# Patient Record
Sex: FEMALE | Race: AMERICAN INDIAN OR ALASKA NATIVE
[De-identification: names, ages, dates, MRNs, and addresses within clinical notes are randomized per-mention and may not be internally consistent; named-entity substitution may affect disease eponyms.]

---

## 2019-02-08 ENCOUNTER — HOSPITAL ENCOUNTER (INPATIENT)
Dept: HOSPITAL 31 - C.ER | Age: 29
LOS: 9 days | Discharge: HOME | DRG: 750 | End: 2019-02-17
Attending: PSYCHIATRY & NEUROLOGY | Admitting: PSYCHIATRY & NEUROLOGY
Payer: MEDICAID

## 2019-02-08 VITALS — OXYGEN SATURATION: 100 %

## 2019-02-08 DIAGNOSIS — F20.9: Primary | ICD-10-CM

## 2019-02-08 DIAGNOSIS — F12.10: ICD-10-CM

## 2019-02-08 DIAGNOSIS — Z91.19: ICD-10-CM

## 2019-02-08 LAB
ALBUMIN SERPL-MCNC: 4.9 G/DL (ref 3.5–5)
ALBUMIN/GLOB SERPL: 1.3 {RATIO} (ref 1–2.1)
ALT SERPL-CCNC: 24 U/L (ref 9–52)
AST SERPL-CCNC: 22 U/L (ref 14–36)
BACTERIA #/AREA URNS HPF: (no result) /[HPF]
BASOPHILS # BLD AUTO: 0 K/UL (ref 0–0.2)
BASOPHILS NFR BLD: 0.5 % (ref 0–2)
BILIRUB UR-MCNC: (no result) MG/DL
BUN SERPL-MCNC: 12 MG/DL (ref 7–17)
CALCIUM SERPL-MCNC: 9.6 MG/DL (ref 8.6–10.4)
EOSINOPHIL # BLD AUTO: 0 K/UL (ref 0–0.7)
EOSINOPHIL NFR BLD: 0.1 % (ref 0–4)
ERYTHROCYTE [DISTWIDTH] IN BLOOD BY AUTOMATED COUNT: 13.7 % (ref 11.5–14.5)
GFR NON-AFRICAN AMERICAN: > 60
GLUCOSE UR STRIP-MCNC: NORMAL MG/DL
HCG,QUALITATIVE URINE: NEGATIVE
HGB BLD-MCNC: 16 G/DL (ref 11–16)
LEUKOCYTE ESTERASE UR-ACNC: (no result) LEU/UL
LYMPHOCYTES # BLD AUTO: 1.4 K/UL (ref 1–4.3)
LYMPHOCYTES NFR BLD AUTO: 16.3 % (ref 20–40)
MCH RBC QN AUTO: 29.5 PG (ref 27–31)
MCHC RBC AUTO-ENTMCNC: 33.3 G/DL (ref 33–37)
MCV RBC AUTO: 88.5 FL (ref 81–99)
MONOCYTES # BLD: 0.7 K/UL (ref 0–0.8)
MONOCYTES NFR BLD: 8.8 % (ref 0–10)
NEUTROPHILS # BLD: 6.2 K/UL (ref 1.8–7)
NEUTROPHILS NFR BLD AUTO: 74.3 % (ref 50–75)
NRBC BLD AUTO-RTO: 0 % (ref 0–2)
PH UR STRIP: 5 [PH] (ref 5–8)
PLATELET # BLD: 315 K/UL (ref 130–400)
PMV BLD AUTO: 9.6 FL (ref 7.2–11.7)
PROT UR STRIP-MCNC: (no result) MG/DL
RBC # BLD AUTO: 5.43 MIL/UL (ref 3.8–5.2)
RBC # UR STRIP: NEGATIVE /UL
SP GR UR STRIP: 1.03 (ref 1–1.03)
SQUAMOUS EPITHIAL: 5 /HPF (ref 0–5)
UROBILINOGEN UR-MCNC: 4 MG/DL (ref 0.2–1)
WBC # BLD AUTO: 8.4 K/UL (ref 4.8–10.8)

## 2019-02-08 NOTE — CT
Date of service: 



02/08/2019



PROCEDURE:  CT Chest with contrast (Pulmonary Angiogram)



HISTORY:

Tachycardia, tachypnea



COMPARISON:

None available.



TECHNIQUE:

Axial computed tomography images were obtained of the chest in the 

pulmonary arterial phase of enhancement. Coronal and sagittal 

reformatted images were created and reviewed.



Intravenous contrast dose: 100 cc Visipaque 320 contrast material.



Radiation dose:



Total exam DLP = 529.18 mGy-cm.



This CT exam was performed using one or more of the following dose 

reduction techniques: Automated exposure control, adjustment of the 

mA and/or kV according to patient size, and/or use of iterative 

reconstruction technique.



FINDINGS:



PULMONARY ARTERIES:

Unremarkable.  Visualized pulmonary trunk, right and left main, lobar,

 segmental and proximal subsegmental branches of the pulmonary 

arteries are well opacified with no definitive evidence of acute 

central pulmonary embolism.  The pulmonary trunk measures 

approximately 2.8 cm. 



AORTA:

No acute findings. No thoracic aortic aneurysm.  Ascending thoracic 

aorta measures approximately 2.84 cm and descending thoracic aorta 

measures approximately 2.0 cm No aortic atherosclerotic calcification 

or mural plaque present.



LUNGS:

Unremarkable. No nodule, mass or pulmonary consolidation. 



PLEURAL SPACES:

Unremarkable. No effusion or pneumothorax. 



HEART:

Unremarkable. No cardiomegaly. No significant pericardial effusion. 



LYMPH NODES:

No significant mediastinal or hilar adenopathy. 



Trachea midline and patent with no large central endoluminal 

lesions.. 



There is a small hiatal hernia with slight wall thickening of distal 

esophagus likely due to protrusion gastric mucosa.  Possibility of 

esophagitis not excluded.  The 



BONES, CHEST WALL:

Unremarkable. No fracture or destructive lesion 



OTHER FINDINGS:

Unremarkable. 



IMPRESSION:

No evidence of acute central pulmonary embolus.



The

## 2019-02-08 NOTE — C.PDOC
History Of Present Illness


28 year old female with a PMHx of bipolar disorder brought in by EMS complaining

of feeling depressed. She denies any suicidal or homicidal ideation. On arrival 

to the ED patient was found to be tachycardic, with HR in the 140s. In triage 

patient denied any drug or alcohol use. When asked if she has any palpitations 

or pain, patient is not offering any other information. Patient is resting 

comfortably on stretcher, refusing to answer further questions.








<Lucie Jones - Last Filed: 19 19:04>


History Per: Patient


History/Exam Limitations: other (uncooperative)


Onset/Duration Of Symptoms: Days


Current Symptoms Are (Timing): Still Present


Associated Symptoms: Depression.  denies: Suicidal Thoughts, Suicidal Plan





<Lucie Jones - Last Filed: 19 19:04>





<Edgar Moncada - Last Filed: 19 19:51>


Time Seen by Provider: 19 13:40


Chief Complaint (Nursing): Psychiatric Evaluation





Past Medical History


Reviewed: Historical Data, Nursing Documentation, Vital Signs


Vital Signs: 





                                Last Vital Signs











Temp  98.7 F   19 13:37


 


Pulse  144 H  19 13:37


 


Resp  16   19 13:37


 


BP  139/98 H  19 13:37


 


Pulse Ox  99   19 13:37














- Medical History


PMH: Bipolar Disorder


Surgical History: No Surg Hx


Family History: States: Unknown Family Hx





- Social History


Hx Tobacco Use: No


Hx Alcohol Use: Yes


Hx Substance Use: No





- Immunization History


Hx Tetanus Toxoid Vaccination: No


Hx Influenza Vaccination: No


Hx Pneumococcal Vaccination: No





<Lucie Jones - Last Filed: 19 19:04>


Vital Signs: 





                                Last Vital Signs











Temp  98.7 F   19 13:37


 


Pulse  99 H  19 17:15


 


Resp  11 L  19 17:15


 


BP  112/84   19 17:15


 


Pulse Ox  99   19 19:04














<Edgar Moncada - Last Filed: 19 19:51>





Review Of Systems


Review Of Systems: ROS cannot be obtained secondary to pt's inabilty to answer 

questions.





<Sally Jonesjorie C - Last Filed: 19 19:04>





Physical Exam





- Physical Exam


Appears: Non-toxic, No Acute Distress


Skin: Warm, Dry, No Diaphoretic, No Pale


Head: Atraumatic, Normacephalic


Eye(s): bilateral: Normal Inspection, PERRL, EOMI


Oral Mucosa: Moist


Neck: Normal ROM


Chest: Symmetrical


Cardiovascular: Rhythm Regular ( Tachycardic), No Friction Rub


Respiratory: Normal Breath Sounds, No Accessory Muscle Use, Other (No 

respiratory distress)


Gastrointestinal/Abdominal: Soft, No Tenderness, No Distention


Back: Normal Inspection, No CVA Tenderness


Extremity: Normal ROM, No Swelling


Extremity: Bilateral: Atraumatic, Normal ROM


Pulses: Left Dorsalis Pedis: Normal, Right Dorsalis Pedis: Normal


Neurological/Psych: Normal Motor, Normal Sensation, Other (Bizarre affect)


Gait: Steady





<Lucie Jones C - Last Filed: 19 19:04>





ED Course And Treatment





- Laboratory Results


Result Diagrams: 


                                 19 14:32





                                 19 14:32


ECG: Interpreted By Me, Viewed By Me


ECG Rhythm: Sinus Tachycardia


Interpretation Of ECG: Normal ST waves, Normal axis


Rate From EC


O2 Sat by Pulse Oximetry: 99 (RA)


Pulse Ox Interpretation: Normal





- Other Rad


  ** CXR


X-Ray: Read By Radiologist


Interpretation: Accession No. : [id number].  Patient Name / ID : NATASHA RUSSELL  / [id number].  Exam Date : 2019 15:54:55 ( Approved ).  Study Comment

:  Sex / Age : F  / 28Y.  Creator : Madalyn Jean-Baptiste MD.  Dictator : Madalyn Jean-Baptiste MD.   :  Approver : Madalyn Jean-Baptiste MD.  Approver2 :  Report Date : 

2019 16:07:28.  My Comment :  ******************************************

*****************************************.  Date of service:  2019.  

HISTORY:  SOB, tachycardia.  COMPARISON:  No prior.  FINDINGS:  LUNGS:  The 

lungs are well inflated and clear.  PLEURA:  No pleural effusions or 

pneumothorax.  CARDIOVASCULAR:  The heart is normal in size.  No aortic atherosc

lerotic calcifications present.  OSSEOUS STRUCTURES:  Within normal limits for 

the patient's age.  VISUALIZED UPPER ABDOMEN:  Normal.  OTHER FINDINGS:  None.  

IMPRESSION:  No active pulmonary disease.





- CT Scan/US


  ** CT Chest


Other Rad Studies (CT/US): Read By Radiologist, Radiology Report Reviewed


CT/US Interpretation: Accession No. : [id number].  Patient Name / ID : 

NATASHA RUSSELL  / [id number].  Exam Date : 2019 16:55:59 ( Approved ).  Study

Comment :  Sex / Age : F  / 28Y.  Creator : Yefri Baker MD.  Dictator : 

Yefri Baker MD.   :  Approver : Yefri Baker MD.  

Approver2 :  Report Date : 2019 17:25:17.  My Comment :  

*************************************************

**********************************.  Date of service:  2019.  PROCEDURE:  

CT Chest with contrast (Pulmonary Angiogram).  HISTORY:  Tachycardia, tachypnea.

 COMPARISON:  None available.  TECHNIQUE:  Axial computed tomography images were

obtained of the chest in the pulmonary arterial phase of enhancement. Coronal 

and sagittal reformatted images were created and reviewed.  Intravenous contrast

dose: 100 cc Visipaque 320 contrast material.  Radiation dose:  Total exam DLP =

529.18 mGy-cm.  This CT exam was performed using one or more of the following 

dose reduction techniques: Automated exposure control, adjustment of the mA 

and/or kV according to patient size, and/or use of iterative reconstruction 

technique.  FINDINGS:  PULMONARY ARTERIES:  Unremarkable.  Visualized pulmonary 

trunk, right and left main, lobar, segmental and proximal subsegmental branches 

of the pulmonary arteries are well opacified with no definitive evidence of 

acute central pulmonary embolism.  The pulmonary trunk measures approximately 

2.8 cm.  AORTA:  No acute findings. No thoracic aortic aneurysm.  Ascending 

thoracic aorta measures approximately 2.84 cm and descending thoracic aorta 

measures approximately 2.0 cm No aortic atherosclerotic calcification or mural 

plaque present.  LUNGS:  Unremarkable. No nodule, mass or pulmonary 

consolidation.  PLEURAL SPACES:  Unremarkable. No effusion or pneumothorax.  

HEART:  Unremarkable. No cardiomegaly. No significant pericardial effusion.  

LYMPH NODES:  No significant mediastinal or hilar adenopathy.  Trachea midline 

and patent with no large central endoluminal lesions..  There is a small hiatal 

hernia with slight wall thickening of distal esophagus likely due to protrusion 

gastric mucosa.  Possibility of esophagitis not excluded.  The.  BONES, CHEST 

WALL:  Unremarkable. No fracture or destructive lesion.  OTHER FINDINGS:  

Unremarkable.  IMPRESSION:  No evidence of acute central pulmonary embolus.





<Lucie Jones - Last Filed: 19 19:04>





- Laboratory Results


Result Diagrams: 


                                 19 14:32





                                 19 14:32


Lab Results: 





                                        











Total Bilirubin  0.7 mg/dL (0.2-1.3)   19  14:32    


 


AST  22 U/L (14-36)   19  14:32    


 


ALT  24 U/L (9-52)   19  14:32    


 


Alkaline Phosphatase  131 U/L ()  H  19  14:32    


 


Total Protein  8.8 g/dL (6.3-8.3)  H  19  14:32    


 


Albumin  4.9 g/dL (3.5-5.0)   19  14:32    


 


Globulin  3.9 gm/dL (2.2-3.9)   19  14:32    


 


Albumin/Globulin Ratio  1.3  (1.0-2.1)   19  14:32    








                                        











Urine Color  Samia  (YELLOW)   19  14:34    


 


Urine Clarity  Clear  (Clear)   19  14:34    


 


Urine pH  5.0  (5.0-8.0)   19  14:34    


 


Ur Specific Gravity  1.035  (1.003-1.030)  H  19  14:34    


 


Urine Protein  2+ mg/dL (NEGATIVE)  H  19  14:34    


 


Urine Glucose (UA)  Normal mg/dL (Normal)   19  14:34    


 


Urine Ketones  Trace mg/dL (NEGATIVE)   19  14:34    


 


Urine Blood  Negative  (NEGATIVE)   19  14:34    


 


Urine Nitrate  Negative  (NEGATIVE)   19  14:34    


 


Urine Bilirubin  2+  (NEGATIVE)  H  19  14:34    


 


Urine Urobilinogen  4.0 mg/dL (0.2-1.0)  H  19  14:34    


 


Ur Leukocyte Esterase  Neg Deborah/uL (Negative)   19  14:34    


 


Urine WBC (Auto)  11 /hpf (0-5)  H  19  14:34    


 


Urine RBC (Auto)  4 /hpf (0-3)  H  19  14:34    


 


Ur Squamous Epith Cells  5 /hpf (0-5)   19  14:34    


 


Urine Bacteria  Rare  (<OCC)   19  14:34    


 


Urine HCG, Qual  Negative  (NEGATIVE)   19  14:34    








                                        











Urine HCG, Qual  Negative  (NEGATIVE)   19  14:34    














<Edgar Moncada - Last Filed: 19 19:51>





Medical Decision Making


Medical Decision Making: 





Initial Plan:


- EKG


- CMP


- Magnesium


- Phosphorous


- Alcohol serum


- UDS


- CBC


- Urinalysis


- Placed on continuous cardiac monitor


- will keep patient on 1:1 obs


- Pending crisis evaluation 





1 mg IV Ativan and IV fluids infusing.


HR remains elevated, 140-150s.


Added on thyroid panel to rule out thyrotoxicosis 





Second mg Ativan given. 


Repeat vitals demonstrate HR now improved, still in the 100s.


Added on CT Chest to rule out PE. TSH is normal and patient has no fever and is 

not in thyroid storm. 





17:45


CT is negative for PE. 


Patient is medically cleared for psychiatric admission. Crisis notified and will

evaluate patient. The patient is A&O x3, states she has  a history of 

schizophrenia and has not taken her medications in 6 months. Crisis consulted. 





<Lucie Jones - Last Filed: 19 19:04>





Disposition





- Disposition


Disposition Time: 18:55





<Lucie Jones - Last Filed: 19 19:04>


Discussed With : Vanessa Colorado


Comment: accepted the pt on his service and took over thecare at 7:50 PM


Doctor Will See Patient In The: Hospital


Counseled Patient/Family Regarding: Studies Performed, Diagnosis





<Edgar Moncada - Last Filed: 19 19:51>





- Disposition


Disposition: HOSPITALIZED


Condition: FAIR


Forms:  [facility] (English)





- Clinical Impression


Clinical Impression: 


 Schizophrenia








- PA / NP / Resident Statement


MD/DO has reviewed & agrees with the documentation as recorded.





- Scribe Statement


The provider has reviewed the documentation as recorded by the Scribjohn Avery





All medical record entries made by the Scribe were at my direction and 

personally dictated by me. I have reviewed the chart and agree that the record 

accurately reflects my personal performance of the history, physical exam, 

medical decision making, and the department course for this patient. I have also

personally directed, reviewed, and agree with the discharge instructions and 

disposition.





<Lucie Jones - Last Filed: 19 19:04>





Physician Patient Turnover


Patient Signed Over To: Edgar Moncada


Handoff Comments: Pending Crisis eval and disposition





<Lucie Jones - Last Filed: 19 19:04>





Decision To Admit





<Lucie Jones - Last Filed: 19 19:04>





- Pt Status Changed To:


Hospital Disposition Of: Inpatient





- Admit Certification


Admit to Inpatient:: After my assessment, the patient will require 

hospitalization for at least two midnights.  This is because of the severity of 

symptoms shown, intensity of services needed, and/or the medical risk in this 

patient being treated as an outpatient.





- InPatient:


Physician Admission Certification: I certify that this patient requires 2 or 

more midnights of care for the following reason:: After my assessment, the 

patient will require hospitalization for at least two midnights.  This is 

because of the severity of symptoms shown, intensity of services needed, and/or 

the medical risk in this patient being treated as an outpatient.





- .


Bed Request Type: Psychiatry


Admitting Physician: Vanessa Colorado





<Edgar Moncada - Last Filed: 19 19:51>





- .


Patient Diagnosis: 


 Schizophrenia

## 2019-02-08 NOTE — PCM.BM
<Berkley Bae - Last Filed: 02/08/19 22:43>





Treatment Plan Problems





- Problems identified on initial assessmt


  ** Self-Care Deficit


Date Initiated: 02/08/19


Time Initiated: 22:43


Assessment reference: NA


Status: Active





  ** Social Isolation


Date Initiated: 02/08/19


Time Initiated: 22:43


Assessment reference: NA


Status: Active





Treatment assets and liabiliti


Patient Assests: cooperative, ADL independent, physically healthy


Patient Liabilities: live alone, substance abuse (THC), imparied memory





- Milieu Protocol


Maintain good personal hygiene: daily Encourage regular showers, daily Remind 

patient to perform daily oral care, daily Assist patient to perform ADL's


Conduct patient checks and document Observation sheet: Q15 minutes


Maintain personal safety: every shift Educate patient to report safety concerns 

to staff, every shift Monitor environment for contraband/sharps


Medication safety: Monitor for expected outcome, potential side effects: every 

shift, Assess barriers to learning: every shift, Assess readiness for medication

education: every shift





<Sadaf Scruggs - Last Filed: 02/11/19 11:48>





Family Contact


Family involvement: Patient does not wish Family/SO involvement


Family contact: Patient declines to allow family contact at present





- Goals for Treatment


Patient goals for treatment: "I want to go to an outpatient program."





Discharge/Continuing Care





- Education Needs


Education Needs: Patient Medication, Patient Diagnosis/Disease Process, Patient 

Coping Skills, Patient Placement options, Patient Community resources





- Discharge


Discharge Criteria: Free of Suicidal thoughts, Normal sleep pattern, Ability to 

care for self, Reduction of target symptoms


Discharge to:: Home





- Treatment Team Participation


Discussed with Family/SO: No


Was Patient/Family/SO present at Treatment Team Meeting: Yes





<Vanessa Colorado - Last Filed: 02/12/19 13:25>





- Diagnosis


(1) Schizophrenia


Status: Acute   


Interventions: 





02/12/19 13:25


* Assess/adjust medications daily and /or as needed


* See patient on an individual basis 7x/week to assess status of hallucinations


* Discuss risks, benefits, side effects and alternatives of medications


*

## 2019-02-08 NOTE — RAD
Date of service: 



02/08/2019



HISTORY:

 SOB, tachycardia 



COMPARISON:

No prior. 



FINDINGS:



LUNGS:

The lungs are well inflated and clear.



PLEURA:

No pleural effusions or pneumothorax. 



CARDIOVASCULAR:

The heart is normal in size.  No aortic atherosclerotic 

calcifications present. 



OSSEOUS STRUCTURES:

Within normal limits for the patient's age.



VISUALIZED UPPER ABDOMEN:

Normal.



OTHER FINDINGS:

None.



IMPRESSION:

No active pulmonary disease.

## 2019-02-09 LAB — T4 FREE SERPL-MCNC: 1.36 NG/DL (ref 0.78–2.19)
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
## 2019-02-11 NOTE — PCM.PYCHPN
Psychiatric Progress Note





- Psychiatric Progress Note


Patient seen today, length of contact: 15 min


Patient Chief Complaint: 





"I am better"


Problems Identified/Issues Discussed: 





The pt is seen, chart reviewed, case discussed with staff.


The pt is compliant with medications and reports no side-effects.


Symptoms are improving but needs more time to stabilize. 


Still delusional and  odd


Support given, psycho-education provided. 


After care discussed. She put in a 48 hr note and  wants to leave, but has no 

insight and it is risky





Medication Change: Yes (increase risperdal to 2  mg)


Medical Record Reviewed: Yes





Mental Status Examination





- Cognitive Function


Orientation: Person, Place, Situation, Time


Memory: Intact


Attention: WNL


Concentration: Poor


Association: WNL


Fund of Knowledge: WNL





- Mood


Mood: Anxious





- Affect


Affect: Constricted





- Speech


Speech: Appropriate





- Formal Thought Process


Formal Thought Process: Hallucinations, Delusions, Paranoia, Loosening of 

associations





- Suicidal Ideation


Suicidal Ideation: No





- Homicidal Ideation


Homicidal Ideation: No





Goal/Treatment Plan





- Goal/Treatment Plan


Need for Continued Stay: Discharge may exacerbated symptoms, Severe functional 

impairment


Progress Toward Problem(s) and Goals/Treatment Plan: 





Start risperidone for psychosis


Attend groups and activities


Support and psychoeducation


Individual therapy


STD tests ordered


Consider Depo medications


Consider ICMS


Try to get collateral information

## 2019-02-11 NOTE — PCM.PYCHPN
Psychiatric Progress Note





- Psychiatric Progress Note


Patient seen today, length of contact: 15 min


Patient Chief Complaint: 





"I need to go to ER"


Problems Identified/Issues Discussed: 


The pt is seen, chart reviewed, case discussed with staff.


Support and psychoeducation given, she is reluctant to take  meds


No SEs reported form meds


She is now  somatically delusional - thinks she is "overheated" and that she 

needs to go to ER


She had no sxs to warrant even  a medical consult


[facility] 





Medication Change: Yes (Risperdal 2 mg)


Medical Record Reviewed: Yes





Mental Status Examination





- Cognitive Function


Orientation: Person, Place, Situation, Time


Memory: Intact


Attention: WNL


Concentration: Poor


Association: WNL


Fund of Knowledge: WNL





- Mood


Mood: Anxious





- Affect


Affect: Constricted





- Speech


Speech: Appropriate





- Formal Thought Process


Formal Thought Process: Hallucinations, Delusions, Paranoia, Loosening of 

associations





- Suicidal Ideation


Suicidal Ideation: No





- Homicidal Ideation


Homicidal Ideation: No





Goal/Treatment Plan





- Goal/Treatment Plan


Need for Continued Stay: Discharge may exacerbated symptoms, Severe functional 

impairment


Progress Toward Problem(s) and Goals/Treatment Plan: 





Increase risperidone for psychosis


Attend groups and activities


Support and psychoeducation


Individual therapy


STD tests ordered


Consider Depo medications


Consider ICMS


Try to get collateral information

## 2019-02-12 NOTE — PCM.PYCHPN
Psychiatric Progress Note





- Psychiatric Progress Note


Patient seen today, length of contact: 16 min


Patient Chief Complaint: 





"I am tired"


Problems Identified/Issues Discussed: 


The pt is seen, chart reviewed, case discussed with staff.


Support and psychoeducation given again


No SEs from medications, risks discussed. However, she refused the Risperdal 

last night


She is given info on why she  needs it


Also, she took a haldol IM due to agitation, again  for the same reason, "I need

to go  to ER"


She was OK and there was no reason to go to ER


Likely somatic delusions along with her persecutory ones. 





Medication Change: Yes (Risperdal 2 mg, klonopin added )


Medical Record Reviewed: Yes





Mental Status Examination





- Cognitive Function


Orientation: Person, Place, Situation, Time


Memory: Intact


Attention: WNL


Concentration: Poor


Association: WNL


Fund of Knowledge: WNL





- Mood


Mood: Anxious





- Affect


Affect: Constricted





- Speech


Speech: Appropriate





- Formal Thought Process


Formal Thought Process: Hallucinations, Delusions, Paranoia, Loosening of 

associations





- Suicidal Ideation


Suicidal Ideation: No





- Homicidal Ideation


Homicidal Ideation: No





Goal/Treatment Plan





- Goal/Treatment Plan


Need for Continued Stay: Discharge may exacerbated symptoms, Severe functional 

impairment


Progress Toward Problem(s) and Goals/Treatment Plan: 





Increase risperidone for psychosis


Attend groups and activities


Support and psychoeducation


Individual therapy


STD tests ordered


Consider Depo medications


Consider ICMS


Try to get collateral information

## 2019-02-13 NOTE — PCM.PYCHPN
Psychiatric Progress Note





- Psychiatric Progress Note


Patient seen today, length of contact: 18 min


Patient Chief Complaint: 





"I am fine"


Problems Identified/Issues Discussed: 


The pt is seen, chart reviewed, case discussed with staff.


She is VERY sick: she has somatic and persecutory delusions, very rigid but 

fluctuates a lot as she hides sometimes. Then, it pops up and she demands to go 

to ER


She refuses to take some meds, i.e. refused Risperdal but then took the next 

one. 


She isolates self a lot, looks a bit depressed but it's more likely due to her 

paranoia 





She finally agreed to take the Haldol DECANOATE monthly injection, which will 

help b/c she will likely be non-compliant upon discharge as she has low insight 

into her condition





She also said her mother is coming from [place] and we will have a family session. 


No SEs


Not suicidal but she is disorganized and will likely risk herself. For instance 

she is now asking for STD test (which was done already) b/c she had unprotected 

sex outside. 


Medication Change: Yes (Risperdal 2 mg, klonopin added )


Medical Record Reviewed: Yes





Mental Status Examination





- Cognitive Function


Orientation: Person, Place, Situation, Time


Memory: Intact


Attention: Poor


Concentration: Poor


Association: WNL


Fund of Knowledge: WNL





- Mood


Mood: Anxious, Other (irate)





- Affect


Affect: Constricted





- Speech


Speech: Appropriate





- Formal Thought Process


Formal Thought Process: Hallucinations, Delusions, Paranoia, Loosening of 

associations





- Suicidal Ideation


Suicidal Ideation: No





- Homicidal Ideation


Homicidal Ideation: No





Goal/Treatment Plan





- Goal/Treatment Plan


Need for Continued Stay: Discharge may exacerbated symptoms, Severe functional 

impairment


Progress Toward Problem(s) and Goals/Treatment Plan: 





Start haldol Dec 50 mg


Risperdal is 2 mg for a little more time


Attend groups and activities


Support and psychoeducation


Individual therapy


STD tests ordered and done


Consider ICMS


Try to get collateral information

## 2019-02-14 VITALS — RESPIRATION RATE: 20 BRPM

## 2019-02-14 NOTE — PCM.PYCHPN
Psychiatric Progress Note





- Psychiatric Progress Note


Patient seen today, length of contact: 18 min


Patient Chief Complaint: 





"I am tired"


Problems Identified/Issues Discussed: 


The pt is seen, chart reviewed, case discussed with staff.


Support and psychoeducation given


Pt is improving slowly and needs more time, still has ongoing symptoms.


Hearing voices, low insight, high risk of relapse


No SEs from medications, risks discussed.


After care discussed


Medication Change: No


Medical Record Reviewed: Yes





Mental Status Examination





- Cognitive Function


Orientation: Person, Place, Situation, Time


Memory: Intact


Attention: Poor


Concentration: Poor


Association: WNL


Fund of Knowledge: WNL





- Mood


Mood: Anxious, Other (irate)





- Affect


Affect: Constricted





- Speech


Speech: Appropriate





- Formal Thought Process


Formal Thought Process: Hallucinations, Delusions, Paranoia, Loosening of 

associations





- Suicidal Ideation


Suicidal Ideation: No





- Homicidal Ideation


Homicidal Ideation: No





Goal/Treatment Plan





- Goal/Treatment Plan


Need for Continued Stay: Discharge may exacerbated symptoms, Severe functional 

impairment


Progress Toward Problem(s) and Goals/Treatment Plan: 





  Haldol Dec 50 mg


Risperdal is 2 mg for a little more time


Attend groups and activities


Support and psychoeducation


Individual therapy


STD tests ordered and done


Consider ICMS


Try to get collateral information





Estimated Date of D/C: 02/18/19

## 2019-02-15 NOTE — PCM.PYCHPN
Psychiatric Progress Note





- Psychiatric Progress Note


Patient seen today, length of contact: 16 min


Patient Chief Complaint: 





"I am dizzy"


Problems Identified/Issues Discussed: 


The pt is seen, chart reviewed, case discussed with staff.


The pt is compliant with medications and reports no side-effects.


Symptoms are improving but needs more time to stabilize. 


Pt attends groups and activities.


Support given, psycho-education provided. 


After care discussed. SW prepared a package for her


Waiting for mo but then again she gets into poor insight periods and demands d/c

but then agrees to stay.


Medication Change: No


Medical Record Reviewed: Yes





Mental Status Examination





- Cognitive Function


Orientation: Person, Place, Situation, Time


Memory: Intact


Attention: Poor


Concentration: Poor


Association: WNL


Fund of Knowledge: WNL





- Mood


Mood: Anxious, Other (irate)





- Affect


Affect: Constricted





- Speech


Speech: Appropriate





- Formal Thought Process


Formal Thought Process: Hallucinations, Delusions, Paranoia, Loosening of 

associations





- Suicidal Ideation


Suicidal Ideation: No





- Homicidal Ideation


Homicidal Ideation: No





Goal/Treatment Plan





- Goal/Treatment Plan


Need for Continued Stay: Discharge may exacerbated symptoms, Severe functional 

impairment


Progress Toward Problem(s) and Goals/Treatment Plan: 





  Haldol Dec 50 mg


Risperdal is 2 mg for a little more time


Attend groups and activities


Support and psychoeducation


Individual therapy


STD tests ordered and done


Consider ICMS


Try to get collateral information





Estimated Date of D/C: 02/18/19

## 2019-02-16 VITALS — TEMPERATURE: 98.3 F | HEART RATE: 83 BPM

## 2019-02-16 NOTE — PCM.PYCHPN
Psychiatric Progress Note





- Psychiatric Progress Note


Patient seen today, length of contact: 16 min


Medication Change: No


Medical Record Reviewed: Yes





Mental Status Examination





- Cognitive Function


Orientation: Person, Place, Situation, Time


Memory: Intact


Attention: Poor


Concentration: Poor


Association: WNL


Fund of Knowledge: WNL





- Mood


Mood: Anxious, Other (irate)





- Affect


Affect: Constricted





- Speech


Speech: Appropriate





- Formal Thought Process


Formal Thought Process: Hallucinations, Delusions, Paranoia, Loosening of 

associations





- Suicidal Ideation


Suicidal Ideation: No





- Homicidal Ideation


Homicidal Ideation: No





Goal/Treatment Plan





- Goal/Treatment Plan


Need for Continued Stay: Discharge may exacerbated symptoms, Severe functional 

impairment


Progress Toward Problem(s) and Goals/Treatment Plan: 








  Haldol Dec 50 mg


Risperdal is 2 mg for a little more time


Attend groups and activities


Support and psychoeducation


Individual therapy


STD tests ordered and done


Consider ICMS


Try to get collateral information


Estimated Date of D/C: 02/18/19

## 2019-02-17 VITALS — DIASTOLIC BLOOD PRESSURE: 64 MMHG | SYSTOLIC BLOOD PRESSURE: 99 MMHG

## 2019-02-17 NOTE — CARD
--------------- APPROVED REPORT --------------





Date of service: 02/08/2019



EKG Measurement

Heart [id number]

SC [id number]

[id number]

[id number]

[id number]



<Conclusion>

Sinus tachycardia

Nonspecific T wave abnormality

Abnormal ECG
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
## 2020-12-22 ENCOUNTER — EMERGENCY (EMERGENCY)
Facility: HOSPITAL | Age: 30
LOS: 0 days | Discharge: HOME | End: 2020-12-22
Attending: EMERGENCY MEDICINE | Admitting: EMERGENCY MEDICINE
Payer: MEDICAID

## 2020-12-22 VITALS — HEIGHT: 68 IN | WEIGHT: 179.9 LBS

## 2020-12-22 VITALS — HEART RATE: 93 BPM | DIASTOLIC BLOOD PRESSURE: 78 MMHG | SYSTOLIC BLOOD PRESSURE: 120 MMHG

## 2020-12-22 DIAGNOSIS — Z79.899 OTHER LONG TERM (CURRENT) DRUG THERAPY: ICD-10-CM

## 2020-12-22 DIAGNOSIS — H57.11 OCULAR PAIN, RIGHT EYE: ICD-10-CM

## 2020-12-22 DIAGNOSIS — H10.9 UNSPECIFIED CONJUNCTIVITIS: ICD-10-CM

## 2020-12-22 DIAGNOSIS — H57.89 OTHER SPECIFIED DISORDERS OF EYE AND ADNEXA: ICD-10-CM

## 2020-12-22 PROCEDURE — 99283 EMERGENCY DEPT VISIT LOW MDM: CPT

## 2020-12-22 RX ORDER — OFLOXACIN 0.3 %
1 DROPS OPHTHALMIC (EYE)
Qty: 1 | Refills: 0
Start: 2020-12-22 | End: 2020-12-28

## 2020-12-22 NOTE — ED PROVIDER NOTE - CLINICAL SUMMARY MEDICAL DECISION MAKING FREE TEXT BOX
Will start abx drops.  Pt to throw out make up, dispose of contacts and not wear them until improved.  Will follow up with Optho. Pt instructed to return if any worsening symptoms or concerns.  They verbalize understanding.

## 2020-12-22 NOTE — ED PROVIDER NOTE - PHYSICAL EXAMINATION
CONSTITUTIONAL: Well-developed; well-nourished; in no acute distress.   SKIN: warm, dry  HEAD: Normocephalic; atraumatic.  EYES: PERRL, EOMI, R. conjunctival injection. 20/30 OD 20/20 OS. 12mmhg R 13 mmhg L. Neg flourescin test.   ENT: No nasal discharge; airway clear.  NECK: Supple; non tender.  EXT: Normal ROM.  No clubbing, cyanosis or edema.   LYMPH: No acute cervical adenopathy.  NEURO: Alert, oriented, grossly unremarkable  PSYCH: Cooperative, appropriate.

## 2020-12-22 NOTE — ED PROVIDER NOTE - PATIENT PORTAL LINK FT
You can access the FollowMyHealth Patient Portal offered by [facility] by registering at the following website: [email or web address]. By joining [employer]’s FollowMyHealth portal, you will also be able to view your health information using other applications (apps) compatible with our system.

## 2020-12-22 NOTE — ED PROVIDER NOTE - NS ED ROS FT
Eyes:  No visual changes, + eye pain, discharge.  ENMT:  No hearing changes, pain, no sore throat or runny nose, no difficulty swallowing  Cardiac:  No chest pain, SOB or edema. No chest pain with exertion.  Respiratory:  No cough or respiratory distress. No hemoptysis. No history of asthma or RAD.  GI:  No nausea, vomiting, diarrhea or abdominal pain.  :  No dysuria, frequency or burning.  MS:  No myalgia, muscle weakness, joint pain or back pain.  Neuro:  No headache or weakness.  No LOC.  Skin:  No skin rash.   Endocrine: No history of thyroid disease or diabetes.

## 2020-12-22 NOTE — ED PROVIDER NOTE - NSFOLLOWUPCLINICS_GEN_ALL_ED_FT
[facility]  Ophthalmolgy  [address], [address]  [place] [address]  Phone: [phone]  Fax:   Follow Up Time: 1-3 Days

## 2020-12-22 NOTE — ED PROVIDER NOTE - ATTENDING CONTRIBUTION TO CARE
31 yo F presents with c/o right eye redness and pain as well as drainage since waking up this am. no h/o trauma.  Pt states that left eye is starting to bother her as well. She wears contact lenses and does not change them every 2 weeks as she should, no change in vision. no fevers or chills.  On exam pt in NAD AAO x 3, PERRL, EOMI, VA noted, IOP noted, no lad, no uptake, mild swelling to sclera

## 2020-12-22 NOTE — ED PROVIDER NOTE - OBJECTIVE STATEMENT
30y F w/ PMH of bipolar disorder presents with R. eye redness. Started acutely earlier today and has been getting progressively worse. Feels as if it is spreading to her L. eye. Has yellow exudates and pain. No alleviating/worsening factors. Denies fever, chills, visual changes, headaches, or numbness/tingling.

## 2020-12-22 NOTE — ED ADULT TRIAGE NOTE - CHIEF COMPLAINT QUOTE
pt states she woke up with a right sided red eye that worsened throughout the day and now feels that it is spreading into her left eye, denies vision changes.

## 2021-04-14 ENCOUNTER — EMERGENCY (EMERGENCY)
Facility: HOSPITAL | Age: 31
LOS: 1 days | Discharge: LEFT BEFORE DISCHARGE | End: 2021-04-14
Admitting: EMERGENCY MEDICINE
Payer: SELF-PAY

## 2021-04-14 ENCOUNTER — EMERGENCY (EMERGENCY)
Facility: HOSPITAL | Age: 31
LOS: 0 days | Discharge: HOME | End: 2021-04-14
Attending: EMERGENCY MEDICINE | Admitting: EMERGENCY MEDICINE
Payer: MEDICAID

## 2021-04-14 VITALS
HEIGHT: 68 IN | OXYGEN SATURATION: 98 % | SYSTOLIC BLOOD PRESSURE: 134 MMHG | DIASTOLIC BLOOD PRESSURE: 92 MMHG | RESPIRATION RATE: 20 BRPM | TEMPERATURE: 98 F | WEIGHT: 199.96 LBS | HEART RATE: 104 BPM

## 2021-04-14 VITALS
OXYGEN SATURATION: 98 % | TEMPERATURE: 99 F | SYSTOLIC BLOOD PRESSURE: 137 MMHG | WEIGHT: 197.98 LBS | HEART RATE: 100 BPM | RESPIRATION RATE: 16 BRPM | DIASTOLIC BLOOD PRESSURE: 85 MMHG

## 2021-04-14 DIAGNOSIS — K59.00 CONSTIPATION, UNSPECIFIED: ICD-10-CM

## 2021-04-14 DIAGNOSIS — Z86.59 PERSONAL HISTORY OF OTHER MENTAL AND BEHAVIORAL DISORDERS: ICD-10-CM

## 2021-04-14 DIAGNOSIS — Z53.21 PROCEDURE AND TREATMENT NOT CARRIED OUT DUE TO PATIENT LEAVING PRIOR TO BEING SEEN BY HEALTH CARE PROVIDER: ICD-10-CM

## 2021-04-14 PROBLEM — F31.9 BIPOLAR DISORDER, UNSPECIFIED: Chronic | Status: ACTIVE | Noted: 2020-12-22

## 2021-04-14 PROCEDURE — L9991: CPT

## 2021-04-14 PROCEDURE — 99284 EMERGENCY DEPT VISIT MOD MDM: CPT

## 2021-04-14 PROCEDURE — 74019 RADEX ABDOMEN 2 VIEWS: CPT | Mod: 26

## 2021-04-14 RX ORDER — MULTIVIT WITH MIN/MFOLATE/K2 340-15/3 G
1 POWDER (GRAM) ORAL ONCE
Refills: 0 | Status: COMPLETED | OUTPATIENT
Start: 2021-04-14 | End: 2021-04-14

## 2021-04-14 RX ADMIN — Medication 1 BOTTLE: at 04:58

## 2021-04-14 NOTE — ED PROVIDER NOTE - PATIENT PORTAL LINK FT
You can access the FollowMyHealth Patient Portal offered by [facility] by registering at the following website: [email or web address]. By joining [employer]’s FollowMyHealth portal, you will also be able to view your health information using other applications (apps) compatible with our system.

## 2021-04-14 NOTE — ED ADULT NURSE NOTE - NSIMPLEMENTINTERV_GEN_ALL_ED
Implemented All Universal Safety Interventions:  [place] to call system. Call bell, personal items and telephone within reach. Instruct patient to call for assistance. Room bathroom lighting operational. Non-slip footwear when patient is off stretcher. Physically safe environment: no spills, clutter or unnecessary equipment. Stretcher in lowest position, wheels locked, appropriate side rails in place.

## 2021-04-14 NOTE — ED PROVIDER NOTE - CARE PROVIDER_API CALL
Rush Monge J  GASTROENTEROLOGY  [address] [place] [id number]  Phone: [phone]  Fax: [phone]  Follow Up Time:

## 2021-04-14 NOTE — ED PROVIDER NOTE - OBJECTIVE STATEMENT
30 year old female past medical history of bipolar comes to emergency room for 4-5 days of constipation. pt states that she usually has 2-3 bowel movements a day, and she has not had any in the last 4-5 days. Pt denies abdominal pain. patient denies chest pain, shortness of breath, nausea, vomiting, diarrhea, no fever/chills, no recent covid contacts. no back pain. no dysuria. Pt states that she is here right now because she wants medicine to help her go to bathroom.

## 2021-04-14 NOTE — ED PROVIDER NOTE - CLINICAL SUMMARY MEDICAL DECISION MAKING FREE TEXT BOX
Pt pw constipation .  no fever vomiting abdominal pain , + flatus. No BM 4 days . No new meds    xray no obstruction  Mg citrate given   dcd well appearing  condition- with outpt follow up with her PMD and GI  this week

## 2021-04-14 NOTE — ED ADULT TRIAGE NOTE - CHIEF COMPLAINT QUOTE
Lipid stable for someone in her age range.  Total 213, goal is 200  , goal is 130  Work on diet and weight loss  Send a copy of chol diet home in mail   Patient states she was seen at hospital earlier today, c/o constipation. H/o bipolar not on meds Patient states she was seen at hospital earlier today, c/o constipation. H/o bipolar not on meds, denies any SI / HI or hearing voices

## 2021-04-14 NOTE — ED PROVIDER NOTE - NSFOLLOWUPINSTRUCTIONS_ED_ALL_ED_FT
Constipation, Adult  Constipation is when a person:    Poops (has a bowel movement) fewer times in a week than normal.  Has a hard time pooping.  Has poop that is dry, hard, or bigger than normal.    Follow these instructions at home:  Eating and drinking     Image   Eat foods that have a lot of fiber, such as:    Fresh fruits and vegetables.  Whole grains.  Beans.    Eat less of foods that are high in fat, low in fiber, or overly processed, such as:    French fries.  Hamburgers.  Cookies.  Candy.  Soda.    Drink enough fluid to keep your pee (urine) clear or pale yellow.  General instructions     Exercise regularly or as told by your doctor.  Go to the restroom when you feel like you need to poop. Do not hold it in.  Take over-the-counter and prescription medicines only as told by your doctor. These include any fiber supplements.  Do pelvic floor retraining exercises, such as:    Doing deep breathing while relaxing your lower belly (abdomen).  Relaxing your pelvic floor while pooping.    Watch your condition for any changes.  Keep all follow-up visits as told by your doctor. This is important.  Contact a doctor if:  You have pain that gets worse.  You have a fever.  You have not pooped for 4 days.  You throw up (vomit).  You are not hungry.  You lose weight.  You are bleeding from the anus.  You have thin, pencil-like poop (stool).  Get help right away if:  You have a fever, and your symptoms suddenly get worse.  You leak poop or have blood in your poop.  Your belly feels hard or bigger than normal (is bloated).  You have very bad belly pain.  You feel dizzy or you faint.  This information is not intended to replace advice given to you by your health care provider. Make sure you discuss any questions you have with your health care provider.

## 2023-07-20 NOTE — ED ADULT NURSE NOTE - SUICIDE SCREENING QUESTION 1
Topical Clindamycin Counseling: Patient counseled that this medication may cause skin irritation or allergic reactions.  In the event of skin irritation, the patient was advised to reduce the amount of the drug applied or use it less frequently.   The patient verbalized understanding of the proper use and possible adverse effects of clindamycin.  All of the patient's questions and concerns were addressed. No

## 2024-09-10 NOTE — ED PROVIDER NOTE - ATTENDING CONTRIBUTION TO CARE
I personally evaluated the patient. I reviewed the Resident’s or Physician Assistant’s note (as assigned above), and agree with the findings and plan except as documented in my note. Statement Selected